# Patient Record
Sex: FEMALE | URBAN - METROPOLITAN AREA
[De-identification: names, ages, dates, MRNs, and addresses within clinical notes are randomized per-mention and may not be internally consistent; named-entity substitution may affect disease eponyms.]

---

## 2020-12-08 ENCOUNTER — APPOINTMENT (OUTPATIENT)
Dept: URBAN - METROPOLITAN AREA SURGERY 18 | Age: 85
Setting detail: DERMATOLOGY
End: 2020-12-09

## 2020-12-08 VITALS
RESPIRATION RATE: 19 BRPM | SYSTOLIC BLOOD PRESSURE: 141 MMHG | TEMPERATURE: 97.3 F | HEART RATE: 54 BPM | DIASTOLIC BLOOD PRESSURE: 87 MMHG

## 2020-12-08 PROBLEM — C44.319 BASAL CELL CARCINOMA OF SKIN OF OTHER PARTS OF FACE: Status: ACTIVE | Noted: 2020-12-08

## 2020-12-08 PROCEDURE — OTHER PRESCRIPTION: OTHER

## 2020-12-08 PROCEDURE — OTHER PATIENT SPECIFIC COUNSELING: OTHER

## 2020-12-08 PROCEDURE — 14041 TIS TRNFR F/C/C/M/N/A/G/H/F: CPT

## 2020-12-08 PROCEDURE — 17311 MOHS 1 STAGE H/N/HF/G: CPT

## 2020-12-08 PROCEDURE — OTHER MOHS SURGERY: OTHER

## 2020-12-08 PROCEDURE — OTHER DIAGNOSIS COMMENT: OTHER

## 2020-12-08 PROCEDURE — OTHER REFERRAL CORRESPONDENCE: OTHER

## 2020-12-08 PROCEDURE — 17312 MOHS ADDL STAGE: CPT

## 2020-12-08 RX ORDER — CEPHALEXIN 500 MG/1
CAPSULE ORAL
Qty: 28 | Refills: 0 | Status: CANCELLED
Stop reason: SDUPTHER

## 2020-12-08 NOTE — PROCEDURE: DIAGNOSIS COMMENT
Comment: Note: previous pathology report indicates the \"right medial eyebrow.\" The correct anatomic site is the right lateral eyebrow. This was confirmed by Dr. Price and the patient herself prior to surgery.
Detail Level: Simple

## 2020-12-08 NOTE — PROCEDURE: PATIENT SPECIFIC COUNSELING
Detail Level: Zone
* Extensive discussion with patient and daughter in law (who was present) regarding the diagnosis/prognosis, treatment plan, alternatives, and all possible outcomes following surgery/reconstruction. We discussed the risks of injury/transection of local structures of importance (see consent discussion) including muscle groups and local sensory or functional nerves (in this location the temporal nerve specifically). All questions and concerns addressed, patient/daughter in law verbalized understanding and agreement with treatment plan.

## 2020-12-08 NOTE — PROCEDURE: MOHS SURGERY
Body Location Override (Optional - Billing Will Still Be Based On Selected Body Map Location If Applicable): Right Lateral Eyebrow/ Lid (\"Right Medial Eyebrow\")

## 2020-12-15 ENCOUNTER — APPOINTMENT (OUTPATIENT)
Dept: URBAN - METROPOLITAN AREA SURGERY 18 | Age: 85
Setting detail: DERMATOLOGY
End: 2020-12-16

## 2020-12-15 DIAGNOSIS — Z48.817 ENCOUNTER FOR SURGICAL AFTERCARE FOLLOWING SURGERY ON THE SKIN AND SUBCUTANEOUS TISSUE: ICD-10-CM

## 2020-12-15 PROCEDURE — OTHER POST-OP WOUND CHECK: OTHER

## 2020-12-15 PROCEDURE — 99024 POSTOP FOLLOW-UP VISIT: CPT

## 2020-12-15 ASSESSMENT — LOCATION DETAILED DESCRIPTION DERM: LOCATION DETAILED: RIGHT LATERAL EYEBROW

## 2020-12-15 ASSESSMENT — LOCATION SIMPLE DESCRIPTION DERM: LOCATION SIMPLE: RIGHT EYEBROW

## 2020-12-15 ASSESSMENT — LOCATION ZONE DERM: LOCATION ZONE: FACE

## 2020-12-15 NOTE — PROCEDURE: POST-OP WOUND CHECK
Additional Comments: Well approximated suture line with no signs and symptoms of infection. Residual bruising and swelling around the eye present. Reviewed eyebrow motor function with daughter-in-law. May get area wet and begin daily dressing changes until no longer crusting and well healed. Follow up 2 weeks.\\n\\nDr. Keshawn's note - seen/agree with above. Well healed for first week postop. Patient able to raise brow/forehead. Blepharoptosis present, discussed etiologies, some if not all due to postoperative edema/swelling and should resolve. If fails to resolve as scar matures (i.e is more due to inherent age related laxity/residual standing cones/scarring than due to postoperative edema/swelling) discussed surgical revision (+/- kenalog injection) to improve. Will follow to ensure swelling continues to resolve as anticipated. No signs/symptoms of poor wound healing or infection. Continued care and signs/symptoms that should prompt call and return to clinic sooner than scheduled were reviewed extensively. Follow up as above, sooner with any change or concerns at surgical site.
Add 86351 Cpt? (Important Note: In 2017 The Use Of 76308 Is Being Tracked By Cms To Determine Future Global Period Reimbursement For Global Periods): yes
Wound Evaluated By: Dr. Mukul Liao
Detail Level: Detailed

## 2021-01-05 ENCOUNTER — APPOINTMENT (OUTPATIENT)
Dept: URBAN - METROPOLITAN AREA SURGERY 18 | Age: 86
Setting detail: DERMATOLOGY
End: 2021-01-06

## 2021-01-05 VITALS — TEMPERATURE: 97.5 F

## 2021-01-05 DIAGNOSIS — Z48.817 ENCOUNTER FOR SURGICAL AFTERCARE FOLLOWING SURGERY ON THE SKIN AND SUBCUTANEOUS TISSUE: ICD-10-CM

## 2021-01-05 PROCEDURE — OTHER POST-OP WOUND CHECK: OTHER

## 2021-01-05 PROCEDURE — 99024 POSTOP FOLLOW-UP VISIT: CPT

## 2021-01-05 ASSESSMENT — LOCATION ZONE DERM: LOCATION ZONE: FACE

## 2021-01-05 ASSESSMENT — LOCATION DETAILED DESCRIPTION DERM: LOCATION DETAILED: RIGHT LATERAL EYEBROW

## 2021-01-05 ASSESSMENT — LOCATION SIMPLE DESCRIPTION DERM: LOCATION SIMPLE: RIGHT EYEBROW

## 2021-01-05 NOTE — PROCEDURE: POST-OP WOUND CHECK
Add 86845 Cpt? (Important Note: In 2017 The Use Of 60253 Is Being Tracked By Cms To Determine Future Global Period Reimbursement For Global Periods): yes
Additional Comments: Well healed surgical site, well approximated without signs/symptoms of infection or poor wound healing. Swelling of eyelids resolved, lid position returned to baseline in comparison with preop photographs. Patient denies any dryness of eye, excessive tearing or visual disturbance at the right lid/eye. Patient instructed to continue daily dressing changes until all crusting is gone. Wound redressed with petroleum, telfa, and tape. Follow up as needed.
Detail Level: Detailed
Wound Evaluated By: Dr. Mukul Liao

## 2023-11-24 NOTE — PROCEDURE: MOHS SURGERY
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 Focal dyskeratosis and apoptosis of keratinocytes is observed. The epidermis is acanthotic with growth of atypical keratinocytes beyond the level of the sebaceous glands, most keratinocytes show little to no keratinization.

## 2024-05-03 NOTE — PROCEDURE: MOHS SURGERY
Called and talked with mom. There are no available appointments in clinic today. Recommended that she bring Greta to . Mom agreed to the plan.    Karine Gomez RN  [facility]    Special Stains Stage 2 - Results: Base On Clearance Noted Above